# Patient Record
Sex: MALE | Race: BLACK OR AFRICAN AMERICAN
[De-identification: names, ages, dates, MRNs, and addresses within clinical notes are randomized per-mention and may not be internally consistent; named-entity substitution may affect disease eponyms.]

---

## 2018-08-23 ENCOUNTER — HOSPITAL ENCOUNTER (EMERGENCY)
Dept: HOSPITAL 89 - ER | Age: 18
Discharge: HOME | End: 2018-08-23
Payer: OTHER GOVERNMENT

## 2018-08-23 VITALS — DIASTOLIC BLOOD PRESSURE: 89 MMHG | SYSTOLIC BLOOD PRESSURE: 112 MMHG

## 2018-08-23 DIAGNOSIS — S09.90XA: ICD-10-CM

## 2018-08-23 DIAGNOSIS — R11.0: ICD-10-CM

## 2018-08-23 DIAGNOSIS — S06.0X1A: Primary | ICD-10-CM

## 2018-08-23 PROCEDURE — 70450 CT HEAD/BRAIN W/O DYE: CPT

## 2018-08-23 PROCEDURE — 99284 EMERGENCY DEPT VISIT MOD MDM: CPT

## 2018-08-23 NOTE — ER REPORT
History and Physical


Time Seen By MD:  20:21


HPI/ROS


CHIEF COMPLAINT: Head injury





HISTORY OF PRESENT ILLNESS: 18-year-old male who was playing basketball when he 

fell.  He was slammed dunking the ball when he hung on the rim he's legs swung 

out nearly horizontal he fell backwards striking the left side of his head on 

the floor.  He is complaining of severe throbbing headache with nausea but no 

actual vomiting.  He notes no photophobia.  He notes no neck pain.  He is unsure

whether he lost consciousness.  He does have some amnesia of the event.  This 

occurred about 3 hours prior to arrival.  He notes no symptoms of numbness, 

tingling or weakness in arms or legs





REVIEW OF SYSTEMS:


Respiratory: No cough, no dyspnea.


Cardiovascular: No chest pain, no palpitations.


Gastrointestinal: No vomiting, no abdominal pain.


Musculoskeletal: No back pain.


Allergies:  


Coded Allergies:  


     No Known Drug Allergies (Unverified , 8/23/18)


Home Meds


Active Scripts


Hydrocodone Bit/Acetaminophen (NORCO 5-325 TABLET) 1 Each Tablet, 1 EACH PO Q4H 

PRN for PAIN, #10 TAB


   Prov:TELMA CEDEÑO DO         8/23/18


Ondansetron (ZOFRAN ODT) 4 Mg Tab.rapdis, 4 MG PO every 6 hours PRN for 

NAUSEA/VOMITING, #10 TAB


   TAKE 1 TABLET BY MOUTH EVERY 12 HOURS


   Prov:DAVIDSONTELMA GUERRERO DO         8/23/18


Reviewed Nurses Notes:  Yes


Old Medical Records Reviewed:  Yes


Constitutional





Vital Sign - Last 24 Hours








 8/23/18 8/23/18





 20:22 20:33


 


Temp 98.9 


 


Pulse 78 85


 


Resp 16 


 


B/P (MAP) 112/89 


 


Pulse Ox 97 97


 


O2 Delivery Room Air 








Physical Exam


  Vital signs stable, afebrile, pulse ox normal


 General Appearance: The patient is alert, has no immediate need for airway 

protection and no signs of toxicity.  Moderate distress, palpation of the head 

and neck reveals tenderness in the left temporal parietal area.  There is no 

depressed skull segment.  There is soft tissue swelling consistent with a 

contusion.


HEENT: Pupils equal and round no pallor or injection.  PERRLA  TMs normal,


Respiratory: There are no retractions, lungs are clear to auscultation.


Cardiovascular: Regular rate and rhythm.


Gastrointestinal:  Abdomen is soft and non tender, no masses, bowel sounds 

normal.


Neurological: Alert and oriented 3, cranial nerves II through XII intact motor 

5/5 all groups, sensory intact to light touch 4, cerebellum grossly intact


Skin: Warm and dry, no rashes.


Musculoskeletal:  Neck is supple non tender.  No tenderness on aggressive 

palpation of the midline


Extremities are nontender, nonswollen and have full range of motion.





DIFFERENTIAL DIAGNOSIS: After history and physical exam differential diagnosis 

was considered for head injury including but not limited to concussion, skull 

fracture, intraparenchymal contusion, subarachnoid, subdural and epidural 

hematoma.





Medical Decision Making


EKG/Imaging


Imaging


Results:


CT scan of the head was obtained.  The results of the study are no acute 

findings.  She was provided a copy of the report.  The study was read by the 

radiologist.  I viewed the images myself on the PACS system.





ED Course/Re-evaluation


ED Course


Patient admitted to an examination room.  H&P was done.  The differential 

diagnoses was considered.  Patient with significant head injury.  He has nausea.

 He has amnesia of the event.  Complaining of severe headache.  A CT scan of the

head is ordered.  He is treated with Tylenol and Zofran.  He does feel better.  

His CAT scan returns normal.  Patient's discharged home.  He is advised 

ibuprofen 600 mg 3 times daily with food.  She given a prescription for Zofran 

and Norco.  He is advised to follow-up with primary care if unimproved in 3-5 

days.


Decision to Disposition Date:  Aug 23, 2018


Decision to Disposition Time:  21:12





Depart


Departure


Latest Vital Signs





Vital Signs








  Date Time  Temp Pulse Resp B/P (MAP) Pulse Ox O2 Delivery O2 Flow Rate FiO2


 


8/23/18 20:33  85   97   


 


8/23/18 20:22 98.9  16 112/89  Room Air  








Impression:  


   Primary Impression:  


   Head injury


   Additional Impressions:  


   Concussion


   Nausea alone


Condition:  Improved


Disposition:  HOME OR SELF-CARE


New Scripts


Hydrocodone Bit/Acetaminophen (NORCO 5-325 TABLET) 1 Each Tablet


1 EACH PO Q4H PRN for PAIN, #10 TAB


   Prov: TELMA CEDEÑO DO         8/23/18 


Ondansetron (ZOFRAN ODT) 4 Mg Tab.rapdis


4 MG PO every 6 hours PRN for NAUSEA/VOMITING, #10 TAB


   TAKE 1 TABLET BY MOUTH EVERY 12 HOURS


   Prov: TELMA CEDEÑO DO         8/23/18


Patient Instructions:  Concussion (ED)





Additional Instructions:  


Take ibuprofen 200 mg 3 tablets 3 times a day with food


Follow-up with primary care if unimproved in 3-5 days.





Problem Qualifiers








   Primary Impression:  


   Head injury


   Encounter type:  initial encounter  Qualified Codes:  S09.90XA - Unspecified 

   injury of head, initial encounter


   Additional Impressions:  


   Concussion


   Encounter type:  initial encounter  Loss of consciousness presence/duration: 

   with LOC of 30 min or less  Qualified Codes:  S06.0X1A - Concussion with loss

   of consciousness of 30 minutes or less, initial encounter








TELMA CEDEÑO DO              Aug 23, 2018 20:22

## 2018-08-23 NOTE — RADIOLOGY IMAGING REPORT
FACILITY: [facility] [place] 

 

PATIENT NAME: Tho Villalba

: 2000

MR: [id number]

V: [id number]

EXAM DATE: 

ORDERING PHYSICIAN: TELMA CEDEÑO

TECHNOLOGIST: 

 

[place] [facility] [place]: Tho Villalba

: 2000

MRN: [id number]

Visit/Account:[id number]

Date of Sevice:  2018

 

ACCESSION #: [id number]

 

EXAMINATION: CT HEAD WITHOUT CONTRAST

 

COMPARISON:  None available

 

HISTORY: fell playing basketball ? LOC

 

PROCEDURE: Noncontrast CT from the vertex through the skull base. One of the following dose optimizat
ion techniques was utilized in the performance of this exam: Automated exposure control; adjustment o
f the mA and/or kV according to the patient's size; or use of an iterative  reconstruction technique.
  Specific details can be referenced in the facility's radiology CT exam operational policy.

 

FINDINGS:

Brain volume: Age-appropriate.

Hemorrhage/extra-axial fluid: None.

Mass effect/midline shift/edema: None.

Ischemia: Gray-white differentiation is preserved.

Ventricles and basal cisterns: Within normal limits.

Posterior fossa: Negative.

Vessels: Negative.

Calvarium, skull base, and scalp: Negative.

Visualized sinuses and orbits: Within normal limits.

 

IMPRESSION:

Negative noncontrast head CT.

 

Report Dictated By: Rodolfo Mata MD at 2018 8:59 PM

 

Report E-Signed By: Rodolfo Mata MD  at 2018 9:06 PM

 

WSN:[id number]